# Patient Record
Sex: FEMALE
[De-identification: names, ages, dates, MRNs, and addresses within clinical notes are randomized per-mention and may not be internally consistent; named-entity substitution may affect disease eponyms.]

---

## 2019-07-22 LAB
ANION GAP SERPL CALC-SCNC: 14.4 MMOL/L (ref 8–16)
BUN SERPL-MCNC: 14 MG/DL (ref 7–26)
BUN/CREAT SERPL: 19 (ref 6–25)
CALCIUM SERPL-MCNC: 9.9 MG/DL (ref 8.4–10.2)
CHLORIDE SERPL-SCNC: 102 MMOL/L (ref 98–107)
CO2 SERPL-SCNC: 29 MMOL/L (ref 22–29)
EGFRCR SERPLBLD CKD-EPI 2021: > 60 ML/MIN (ref 60–?)
GLUCOSE SERPLBLD-MCNC: 144 MG/DL (ref 74–118)
POTASSIUM SERPL-SCNC: 4.4 MMOL/L (ref 3.5–5.1)
SODIUM SERPL-SCNC: 141 MMOL/L (ref 136–145)

## 2019-08-05 ENCOUNTER — HOSPITAL ENCOUNTER (OUTPATIENT)
Dept: HOSPITAL 88 - OR | Age: 54
Discharge: HOME | End: 2019-08-05
Attending: INTERNAL MEDICINE
Payer: COMMERCIAL

## 2019-08-05 VITALS — DIASTOLIC BLOOD PRESSURE: 71 MMHG | SYSTOLIC BLOOD PRESSURE: 123 MMHG

## 2019-08-05 DIAGNOSIS — Z01.810: ICD-10-CM

## 2019-08-05 DIAGNOSIS — Z79.84: ICD-10-CM

## 2019-08-05 DIAGNOSIS — J45.20: ICD-10-CM

## 2019-08-05 DIAGNOSIS — Z01.812: ICD-10-CM

## 2019-08-05 DIAGNOSIS — G47.33: ICD-10-CM

## 2019-08-05 DIAGNOSIS — K64.8: ICD-10-CM

## 2019-08-05 DIAGNOSIS — E11.9: ICD-10-CM

## 2019-08-05 DIAGNOSIS — R03.0: ICD-10-CM

## 2019-08-05 DIAGNOSIS — K63.5: ICD-10-CM

## 2019-08-05 DIAGNOSIS — Z12.11: Primary | ICD-10-CM

## 2019-08-05 DIAGNOSIS — K62.5: ICD-10-CM

## 2019-08-05 PROCEDURE — 45378 DIAGNOSTIC COLONOSCOPY: CPT

## 2019-08-05 PROCEDURE — 93005 ELECTROCARDIOGRAM TRACING: CPT

## 2019-08-05 PROCEDURE — 36415 COLL VENOUS BLD VENIPUNCTURE: CPT

## 2019-08-05 PROCEDURE — 82948 REAGENT STRIP/BLOOD GLUCOSE: CPT

## 2019-08-05 PROCEDURE — 80048 BASIC METABOLIC PNL TOTAL CA: CPT

## 2019-08-05 PROCEDURE — 81025 URINE PREGNANCY TEST: CPT

## 2019-08-05 PROCEDURE — 45384 COLONOSCOPY W/LESION REMOVAL: CPT

## 2019-08-05 NOTE — OPERATIVE REPORT
DATE OF PROCEDURE:  08/05/2019

 

SURGEON:  Eugene Florian MD

 

PROCEDURES:  Colonoscopy and polypectomy.

 

INDICATION FOR PROCEDURES:  Colorectal cancer screening.

 

MEDICATIONS:  The patient was done under MAC, please see anesthesiologist's note.

 

PROCEDURE IN DETAIL:  With the patient in left lateral decubitus position, a flexible

fiberoptic Olympus colonoscope was inserted into the rectum with ease and advanced all

the way to the cecum.  It was then withdrawn slowly.  Mucosa overlying the cecum,

ascending colon, transverse colon, and descending colon appeared to be within normal

limits.  One minute polyp was hot biopsied from the sigmoid colon and site was

hemoclipped.  The rectum appeared to be within normal limits.  The scope was then

retroflexed into the distal rectum and small internal hemorrhoids were noted, none of

which was actively bleeding.  The scope was then straightened out.  The scope was

subsequently withdrawn and the patient tolerated the procedure well. 

 

IMPRESSION:  

1. Sigmoid colon polyp, hot biopsied, site hemoclipped.

2. Internal hemorrhoids, none actively bleeding.

 

PLAN:  Follow up histology.  Initiate high-fiber, low-fat diet.  Initiate high-fiber

supplement.  The patient might benefit from a followup colonoscopy in 5 years. 

 

 

 

 

______________________________

Eugene Florian MD

 

[facility]

D:  08/05/2019 13:53:58

T:  08/05/2019 20:15:09

Job #:  [id number]

 

cc:            Dr. Inderjit Nielsen

## 2019-08-05 NOTE — XMS REPORT
Patient Summary Document

                             Created on: 2019



ANGELINA PERDUE

External Reference #: [id number]

: 1965

Sex: Female



Demographics







                          Address                   [address] [place]  [address]

 

                          Home Phone                [phone]

 

                          Preferred Language        Unknown

 

                          Marital Status            Unknown

 

                          Congregation Affiliation     Unknown

 

                          Race                      Unknown

 

                          Ethnic Group              Unknown





Author







                          Author                    [facility]

 

                          Address                   Unknown

 

                          Phone                     Unavailable







Care Team Providers







                    Care Team Member Name    Role                Phone

 

                    ER,  PHYSICIAN      PP                  Unavailable

 

                    ELISA OLMOS    Unavailable         Unavailable







Problems

This patient has no known problems.



Allergies, Adverse Reactions, Alerts

This patient has no known allergies or adverse reactions.



Medications

This patient has no known medications.



Encounters







             Start Date/Time    End Date/Time    Encounter Type    Admission Type    Attending Clinicians

                    Care Facility       Care Department     Encounter ID

 

           2017 22:51:00    2017 22:51:00    Emergency    E          ELISA OLMOS    [facility] 

                          MED                       [id number]







Results







           Test Description    Test Time    Test Comments    Text Results    Atomic Results    Result

 Comments

 

                Urinalysis Complete    2017 01:40:00                      

 

   

 

                Color (test code=COLOR)    Yellow          Yellow,Straw,Pl yellow     

 

                Clarity (test code=CLAR)    Clear           Clear            

 

                Specific Gravity (test code=SPGR)    1.031           1.001-1.035      

 

                pH (test code=PH)    5.0             5.0-9.0          

 

                Ketone (test code=KET)    150 mg/dL       Negative         

 

                Glucose (test code=GLUCUR)    Negative mg/dL    Negative         

 

                Protein (test code=PROT)    150 mg/dL       Negative         

 

                Bilirubin (test code=BILI)    See IctoTest mg/dL    Negative         

 

                Occult Blood (test code=UDOB)    Large           Negative         

 

                Urobilinogen (test code=UROB)    0.2 mg/dL       0.2-1.0          

 

                Nitrite (test code=NIT)    Negative        Negative         

 

                Leuk Esterase (test code=LEUK)    Negative        Negative         

 

                Ictotest (test code=ICTOTEST)    Confirmed Negative    Negative,Confirmed Negative    

 

 

                Micros Exam (test code=MEXAM)    Indicated                        

 

                Epithelial Cells (test code=EPI)    Few /LPF        0-30             

 

                WBC, Urine (test code=UWBC)    0-5 /HPF        0-5              

 

                RBC, Urine (test code=URBC)    6-10 /HPF       0-5              

 

                Bacteria (test code=BACT)    Few /HPF                         





[id number] 00:03:00* 





                Test Item       Value           Reference Range    Comments

 

                Lipase (test code=LIP)    28 U/L          13-60            





Comprehensive Metabolic [id number] 00:03:00* 





                Test Item       Value           Reference Range    Comments

 

                Sodium (test code=NA)    136 mmol/L      135-145          

 

                Potassium (test code=K)    3.8 mmol/L      3.5-5.1          

 

                Chloride (test code=CL)    96 mmol/L                  

 

                Carbon Dioxide (test code=CO2)    26 mmol/L       22-29            

 

                Glucose (test code=GLU)    252 mg/dL                  

 

                Blood Urea Nitrogen (test code=BUN)    21 mg/dL        6-20             

 

                Creatinine (test code=CREAT)    0.8 mg/dL       0.5-0.9          

 

                Calcium (test code=CA)    9.7 mg/dL       8.3-10.5         

 

                Prot Total (test code=TP)    7.9 g/dL        6.4-8.3          

 

                Albumin (test code=ALB)    4.4 g/dL        3.5-5.2          

 

                A/G Ratio (test code=AGRATIO)    1.3 Ratio                        

 

                Globulin (test code=GLOB)    3.5             2.9-3.1          

 

                Bili Total (test code=TBIL)    0.4 mg/dL       0.1-0.9          

 

                Alk Phos (test code=APHOS)    83 U/L                     

 

                AST (test code=AST)    24 U/L          1-32             

 

                ALT (test code=ALT)    33 U/L          1-33             

 

                BUN/Creatinine Ratio (test code=BCRATIO)    26.3                             

 

                Anion Gap (test code=AGAP)    14 mmol/L       7-16             

 

                Estimated GFR (test code=GFR)    >60 mL/min/1.73m2                    eGFR (estimated Glomerular 

Filtration Rate) is an estimated value,calculated from the patient's serum 
creatinine using the MDRD equation.It is NOT the patient's actual GFR. The eGFR 
provides a more clinicallyuseful measure of kidney disease than serum creatinine
alone.***This calculation takes sex and race into account, if the informationis 
provided. If the race is not provided, and the patient isAfrican-American, 
multiply by 1.212. If sex is not provided, and thepatient is female, multiply by
0.742. Results for patients <18 years ofage have not been validated by the MDRD 
study and should be interpretedwith caution.eGFR Result Interpretation:eGFR > 
or=60 is in the Normal RangeeGFR < 60 may mean kidney diseaseeGFR < 15 may mean 
kidney failure***Ranges recommended by the National Kidney Foundat
ion,http://nkdep.nih.gov





CBC with [id number] 23:42:00* 





                Test Item       Value           Reference Range    Comments

 

                WBC (test code=WBC)    14.9 K/cumm     4.4-10.5         

 

                RBC (test code=RBC)    4.97 M/cumm     3.75-5.20        

 

                Hemoglobin (test code=HGB)    14.5 gm/dL      12.2-14.8        

 

                Hematocrit (test code=HCT)    42.8 %          36.5-44.4        

 

                MCV (test code=MCV)    86.1 fL                    

 

                MCH (test code=MCH)    29.1 pg         27.0-32.5        

 

                MCHC (test code=MCHC)    33.8 g/dL       32.0-37.5        

 

                RDW (test code=RDW)    13.0 %          11.5-14.5        

 

                Platelet Count (test code=PLTCT)    305 K/cumm      140-440          

 

                MPV (test code=MPV)    10.0 fL                          

 

                Diff Method (test code=DIFFM)    Auto                             

 

                Neutrophil (test code=NEUT)    85.9 %          36-70            

 

                Lymphocyte (test code=LYMPH)    10.3 %          12-44            

 

                Monocyte (test code=MONO)    2.6 %           0-11             

 

                Eosinophil (test code=EOS)    1.0 %           0-7              

 

                Basophil (test code=BASO)    0.2 %           0-2              

 

                Neutro Abs (test code=ANEUT)    12.8 K/cumm     1.6-7.4          

 

                Lymph Abs (test code=ALYMPH)    1.5 K/cumm      0.5-4.6          

 

                Mono Abs (test code=AMONO)    0.4 K/cumm      0.0-1.2          

 

                Eos Abs (test code=AEOS)    0.16 K/cumm     0.00-0.74        

 

                Baso Abs (test code=ABASO)    0.0 K/cumm      0.00-0.21

## 2019-08-16 ENCOUNTER — HOSPITAL ENCOUNTER (OUTPATIENT)
Dept: HOSPITAL 88 - MAMMO | Age: 54
End: 2019-08-16
Payer: COMMERCIAL

## 2019-08-16 DIAGNOSIS — Z12.31: Primary | ICD-10-CM

## 2019-08-16 PROCEDURE — 77067 SCR MAMMO BI INCL CAD: CPT

## 2019-09-03 NOTE — DIAGNOSTIC IMAGING REPORT
#[id number] - [id number]

#BILATERAL DIGITAL SCREENING MAMMOGRAM WITH CAD: 8/16/2019

CLINICAL: Routine screening.  



Comparison is made to exams dated:  9/6/2017 mammogram and 7/15/2016 mammogram - [facility].  Current study contains 7 films.  

The tissue of both breasts is predominantly fatty.  

Current study was also evaluated with a Computer Aided Detection (CAD) system.  

Benign appearing calcifications are noted bilaterally.  

No significant masses, calcifications, or other findings are seen in either breast.  



IMPRESSION: BENIGN

There is no mammographic evidence of malignancy.  A 1 year screening mammogram is recommended. 

The patient will be notified by letter of the results.  





KATERYNA COMER M.D.          

ct/penrad:8/30/2019 10:11:59  



Imaging Technologist: Joanna GAYTAN)([place]), [facility]

letter sent: Normal Exam  

Mammogram BI-RADS: 2 Benign